# Patient Record
Sex: FEMALE | Race: AMERICAN INDIAN OR ALASKA NATIVE | NOT HISPANIC OR LATINO | Employment: FULL TIME | URBAN - METROPOLITAN AREA
[De-identification: names, ages, dates, MRNs, and addresses within clinical notes are randomized per-mention and may not be internally consistent; named-entity substitution may affect disease eponyms.]

---

## 2023-11-09 PROBLEM — M54.50 LUMBAR PAIN: Status: ACTIVE | Noted: 2023-11-09

## 2023-11-09 PROBLEM — M43.10 SPONDYLOLISTHESIS: Status: ACTIVE | Noted: 2023-11-09

## 2023-11-09 PROBLEM — M54.9 BACK PAIN: Status: ACTIVE | Noted: 2023-11-09

## 2023-11-09 PROBLEM — M54.16 LUMBAR RADICULOPATHY: Status: ACTIVE | Noted: 2023-11-09

## 2023-11-09 PROBLEM — Z04.9 CONDITION NOT FOUND: Status: ACTIVE | Noted: 2023-11-09

## 2023-11-09 PROBLEM — M06.9 RHEUMATOID ARTHRITIS (MULTI): Status: ACTIVE | Noted: 2023-11-09

## 2023-11-09 PROBLEM — R31.9 BLOOD URINE: Status: ACTIVE | Noted: 2023-11-09

## 2023-11-09 PROBLEM — D64.9 ANEMIA: Status: ACTIVE | Noted: 2023-11-09

## 2023-11-09 PROBLEM — Q66.70 HIGH ARCHES, CONGENITAL: Status: ACTIVE | Noted: 2023-11-09

## 2023-11-09 RX ORDER — OXYCODONE AND ACETAMINOPHEN 5; 325 MG/1; MG/1
1 TABLET ORAL EVERY 8 HOURS PRN
COMMUNITY
Start: 2022-12-31 | End: 2023-11-10 | Stop reason: ALTCHOICE

## 2023-11-09 RX ORDER — IBUPROFEN 800 MG/1
TABLET ORAL
COMMUNITY
End: 2023-11-10 | Stop reason: ALTCHOICE

## 2023-11-10 ENCOUNTER — LAB (OUTPATIENT)
Dept: LAB | Facility: LAB | Age: 38
End: 2023-11-10
Payer: MEDICARE

## 2023-11-10 ENCOUNTER — OFFICE VISIT (OUTPATIENT)
Dept: PRIMARY CARE | Facility: CLINIC | Age: 38
End: 2023-11-10
Payer: MEDICARE

## 2023-11-10 VITALS
TEMPERATURE: 97.7 F | SYSTOLIC BLOOD PRESSURE: 116 MMHG | RESPIRATION RATE: 16 BRPM | WEIGHT: 190.2 LBS | HEART RATE: 72 BPM | DIASTOLIC BLOOD PRESSURE: 72 MMHG

## 2023-11-10 DIAGNOSIS — Z00.00 GENERAL MEDICAL EXAM: ICD-10-CM

## 2023-11-10 DIAGNOSIS — D64.9 ANEMIA, UNSPECIFIED TYPE: ICD-10-CM

## 2023-11-10 DIAGNOSIS — M54.9 CHRONIC BACK PAIN, UNSPECIFIED BACK LOCATION, UNSPECIFIED BACK PAIN LATERALITY: ICD-10-CM

## 2023-11-10 DIAGNOSIS — Z00.00 GENERAL MEDICAL EXAM: Primary | ICD-10-CM

## 2023-11-10 DIAGNOSIS — G89.29 CHRONIC BACK PAIN, UNSPECIFIED BACK LOCATION, UNSPECIFIED BACK PAIN LATERALITY: ICD-10-CM

## 2023-11-10 DIAGNOSIS — M06.9 RHEUMATOID ARTHRITIS, INVOLVING UNSPECIFIED SITE, UNSPECIFIED WHETHER RHEUMATOID FACTOR PRESENT (MULTI): ICD-10-CM

## 2023-11-10 PROBLEM — Z04.9 CONDITION NOT FOUND: Status: RESOLVED | Noted: 2023-11-09 | Resolved: 2023-11-10

## 2023-11-10 PROBLEM — R31.9 BLOOD URINE: Status: RESOLVED | Noted: 2023-11-09 | Resolved: 2023-11-10

## 2023-11-10 LAB
ALBUMIN SERPL BCP-MCNC: 4.1 G/DL (ref 3.4–5)
ALP SERPL-CCNC: 54 U/L (ref 33–110)
ALT SERPL W P-5'-P-CCNC: 11 U/L (ref 7–45)
ANION GAP SERPL CALC-SCNC: 11 MMOL/L (ref 10–20)
AST SERPL W P-5'-P-CCNC: 15 U/L (ref 9–39)
BILIRUB SERPL-MCNC: 0.4 MG/DL (ref 0–1.2)
BUN SERPL-MCNC: 12 MG/DL (ref 6–23)
CALCIUM SERPL-MCNC: 8.9 MG/DL (ref 8.6–10.3)
CHLORIDE SERPL-SCNC: 104 MMOL/L (ref 98–107)
CHOLEST SERPL-MCNC: 139 MG/DL (ref 0–199)
CHOLESTEROL/HDL RATIO: 2.8
CO2 SERPL-SCNC: 28 MMOL/L (ref 21–32)
CREAT SERPL-MCNC: 0.61 MG/DL (ref 0.5–1.05)
CRP SERPL-MCNC: 0.55 MG/DL
ERYTHROCYTE [DISTWIDTH] IN BLOOD BY AUTOMATED COUNT: 12.6 % (ref 11.5–14.5)
ERYTHROCYTE [SEDIMENTATION RATE] IN BLOOD BY WESTERGREN METHOD: 9 MM/H (ref 0–20)
GFR SERPL CREATININE-BSD FRML MDRD: >90 ML/MIN/1.73M*2
GLUCOSE SERPL-MCNC: 77 MG/DL (ref 74–99)
HCT VFR BLD AUTO: 38.3 % (ref 36–46)
HDLC SERPL-MCNC: 49.9 MG/DL
HGB BLD-MCNC: 12.8 G/DL (ref 12–16)
IRON SATN MFR SERPL: 17 % (ref 25–45)
IRON SERPL-MCNC: 55 UG/DL (ref 35–150)
LDLC SERPL CALC-MCNC: 77 MG/DL
MCH RBC QN AUTO: 33.2 PG (ref 26–34)
MCHC RBC AUTO-ENTMCNC: 33.4 G/DL (ref 32–36)
MCV RBC AUTO: 100 FL (ref 80–100)
NON HDL CHOLESTEROL: 89 MG/DL (ref 0–149)
NRBC BLD-RTO: 0 /100 WBCS (ref 0–0)
PLATELET # BLD AUTO: 240 X10*3/UL (ref 150–450)
POTASSIUM SERPL-SCNC: 3.8 MMOL/L (ref 3.5–5.3)
PROT SERPL-MCNC: 6.5 G/DL (ref 6.4–8.2)
RBC # BLD AUTO: 3.85 X10*6/UL (ref 4–5.2)
SODIUM SERPL-SCNC: 139 MMOL/L (ref 136–145)
TIBC SERPL-MCNC: 330 UG/DL (ref 240–445)
TRIGL SERPL-MCNC: 60 MG/DL (ref 0–149)
TSH SERPL-ACNC: 0.92 MIU/L (ref 0.44–3.98)
UIBC SERPL-MCNC: 275 UG/DL (ref 110–370)
VIT B12 SERPL-MCNC: 482 PG/ML (ref 211–911)
VLDL: 12 MG/DL (ref 0–40)
WBC # BLD AUTO: 6.4 X10*3/UL (ref 4.4–11.3)

## 2023-11-10 PROCEDURE — 80053 COMPREHEN METABOLIC PANEL: CPT

## 2023-11-10 PROCEDURE — 80061 LIPID PANEL: CPT

## 2023-11-10 PROCEDURE — 84443 ASSAY THYROID STIM HORMONE: CPT

## 2023-11-10 PROCEDURE — 83036 HEMOGLOBIN GLYCOSYLATED A1C: CPT

## 2023-11-10 PROCEDURE — 4004F PT TOBACCO SCREEN RCVD TLK: CPT | Performed by: NURSE PRACTITIONER

## 2023-11-10 PROCEDURE — 86140 C-REACTIVE PROTEIN: CPT

## 2023-11-10 PROCEDURE — 86431 RHEUMATOID FACTOR QUANT: CPT

## 2023-11-10 PROCEDURE — 99385 PREV VISIT NEW AGE 18-39: CPT | Performed by: NURSE PRACTITIONER

## 2023-11-10 PROCEDURE — 83550 IRON BINDING TEST: CPT

## 2023-11-10 PROCEDURE — 85652 RBC SED RATE AUTOMATED: CPT

## 2023-11-10 PROCEDURE — 83540 ASSAY OF IRON: CPT

## 2023-11-10 PROCEDURE — 36415 COLL VENOUS BLD VENIPUNCTURE: CPT

## 2023-11-10 PROCEDURE — 85027 COMPLETE CBC AUTOMATED: CPT

## 2023-11-10 PROCEDURE — 86038 ANTINUCLEAR ANTIBODIES: CPT

## 2023-11-10 PROCEDURE — 82607 VITAMIN B-12: CPT

## 2023-11-10 ASSESSMENT — PATIENT HEALTH QUESTIONNAIRE - PHQ9
1. LITTLE INTEREST OR PLEASURE IN DOING THINGS: NOT AT ALL
2. FEELING DOWN, DEPRESSED OR HOPELESS: NOT AT ALL
SUM OF ALL RESPONSES TO PHQ9 QUESTIONS 1 AND 2: 0

## 2023-11-10 ASSESSMENT — ENCOUNTER SYMPTOMS
GASTROINTESTINAL NEGATIVE: 1
ENDOCRINE NEGATIVE: 1
HEMATOLOGIC/LYMPHATIC NEGATIVE: 1
CARDIOVASCULAR NEGATIVE: 1
RESPIRATORY NEGATIVE: 1
EYES NEGATIVE: 1
MUSCULOSKELETAL NEGATIVE: 1
NEUROLOGICAL NEGATIVE: 1
ALLERGIC/IMMUNOLOGIC NEGATIVE: 1
PSYCHIATRIC NEGATIVE: 1
CONSTITUTIONAL NEGATIVE: 1
PALPITATIONS: 0

## 2023-11-10 ASSESSMENT — ANXIETY QUESTIONNAIRES
5. BEING SO RESTLESS THAT IT IS HARD TO SIT STILL: NOT AT ALL
GAD7 TOTAL SCORE: 0
4. TROUBLE RELAXING: NOT AT ALL
2. NOT BEING ABLE TO STOP OR CONTROL WORRYING: NOT AT ALL
3. WORRYING TOO MUCH ABOUT DIFFERENT THINGS: NOT AT ALL
7. FEELING AFRAID AS IF SOMETHING AWFUL MIGHT HAPPEN: NOT AT ALL
1. FEELING NERVOUS, ANXIOUS, OR ON EDGE: NOT AT ALL
6. BECOMING EASILY ANNOYED OR IRRITABLE: NOT AT ALL

## 2023-11-10 ASSESSMENT — PAIN SCALES - GENERAL: PAINLEVEL: 0-NO PAIN

## 2023-11-10 NOTE — PROGRESS NOTES
Subjective   Patient ID: Olga Mina is a 38 y.o. female who presents for Ear Fullness and Nasal Congestion (Pt here to discuss ongoing congestion, left ear fullness).    Patient suffers from joint pain swelling and stiffness since 2014 her mom suffered from rheumatoid arthritis most of her life.   She is currently seeing Kristina Arana functional medicine NP to reset her immune system.   Her mom passed away at 57 last year due to pancreatic cancer and very little familial support.   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   Believes that grandmother was tested for BRCA gene but doesn't know if she was positive.   She gets PAP smear every 3 years with GYN.    Started therapy back in August and it has been helpful.,   Goes to derm annually.     Mumtaz farley reports Tdap up to date.       Review of Systems   Constitutional: Negative.    HENT: Negative.     Eyes: Negative.    Respiratory: Negative.     Cardiovascular: Negative.  Negative for chest pain, palpitations and leg swelling.   Gastrointestinal: Negative.    Endocrine: Negative.    Genitourinary: Negative.    Musculoskeletal: Negative.    Skin: Negative.    Allergic/Immunologic: Negative.    Neurological: Negative.    Hematological: Negative.    Psychiatric/Behavioral: Negative.         Objective   /72   Pulse 72   Temp 36.5 °C (97.7 °F)   Resp 16   Wt 86.3 kg (190 lb 3.2 oz)   LMP 11/01/2023     Physical Exam  Vitals and nursing note reviewed.   Constitutional:       Appearance: Normal appearance.   HENT:      Head: Normocephalic and atraumatic.      Nose: Nose normal.      Mouth/Throat:      Mouth: Mucous membranes are moist.   Eyes:      Pupils: Pupils are equal, round, and reactive to light.   Cardiovascular:      Rate and Rhythm: Normal rate and regular rhythm.      Pulses: Normal pulses.      Heart sounds: Normal heart sounds.   Pulmonary:      Effort: Pulmonary effort is  normal.      Breath sounds: Normal breath sounds.   Abdominal:      Palpations: Abdomen is soft.   Musculoskeletal:         General: Normal range of motion.      Cervical back: Normal range of motion.   Skin:     Capillary Refill: Capillary refill takes 2 to 3 seconds.   Neurological:      General: No focal deficit present.      Mental Status: She is alert and oriented to person, place, and time.      Cranial Nerves: Cranial nerves 2-12 are intact.      Sensory: Sensation is intact.      Motor: Motor function is intact.      Deep Tendon Reflexes: Reflexes are normal and symmetric.   Psychiatric:         Mood and Affect: Mood normal.       Assessment/Plan   Problem List Items Addressed This Visit             ICD-10-CM       Hematology and Neoplasia    Anemia D64.9    Relevant Orders    CBC    Iron and TIBC       Multi-system (Lupus, Sarcoid)    Rheumatoid arthritis (CMS/HCC) M06.9    Relevant Orders    CBC    Comprehensive Metabolic Panel    Iron and TIBC    PIERRE with Reflex to MICKY    Rheumatoid Factor    Sedimentation Rate    C-Reactive Protein       Musculoskeletal and Injuries    Back pain M54.9     Other Visit Diagnoses         Codes    General medical exam    -  Primary Z00.00    Relevant Orders    CBC    Comprehensive Metabolic Panel    Hemoglobin A1C    Vitamin B12    TSH with reflex to Free T4 if abnormal    Lipid Panel

## 2023-11-11 LAB
EST. AVERAGE GLUCOSE BLD GHB EST-MCNC: 97 MG/DL
HBA1C MFR BLD: 5 %
RHEUMATOID FACT SER NEPH-ACNC: <10 IU/ML (ref 0–15)

## 2023-11-13 LAB — ANA SER QL HEP2 SUBST: NEGATIVE

## 2024-01-09 ASSESSMENT — ENCOUNTER SYMPTOMS
DIARRHEA: 0
SORE THROAT: 0
ABDOMINAL PAIN: 0
RHINORRHEA: 1
HEADACHES: 0
COUGH: 0
VOMITING: 0
NECK PAIN: 1

## 2024-01-10 ENCOUNTER — OFFICE VISIT (OUTPATIENT)
Dept: OTOLARYNGOLOGY | Facility: CLINIC | Age: 39
End: 2024-01-10
Payer: MEDICARE

## 2024-01-10 DIAGNOSIS — Z86.69 HISTORY OF IMPACTED CERUMEN: Primary | ICD-10-CM

## 2024-01-10 PROCEDURE — 99203 OFFICE O/P NEW LOW 30 MIN: CPT | Performed by: OTOLARYNGOLOGY

## 2024-01-10 ASSESSMENT — ENCOUNTER SYMPTOMS
DIARRHEA: 0
NECK PAIN: 1
COUGH: 0
SORE THROAT: 0
VOMITING: 0
RHINORRHEA: 1
HEADACHES: 0
ABDOMINAL PAIN: 0

## 2024-01-10 NOTE — PROGRESS NOTES
Olga Mina is a 38 y.o. year old female patient with WAX / EAR INFECTION     Patient presents to the office today to establish new ENT care.  She has a known history of previous ear infections and cerumen impaction.   She states that she did have recent cleaning approximately 2 months ago and the ears have been fine since that visit.  She denies other ENT related concerns.      Review of Systems   HENT:  Positive for ear discharge, ear pain and rhinorrhea. Negative for hearing loss and sore throat.    Respiratory:  Negative for cough.    Gastrointestinal:  Negative for abdominal pain, diarrhea and vomiting.   Musculoskeletal:  Positive for neck pain.   Skin:  Negative for rash.   Neurological:  Negative for headaches.   All other systems reviewed and are negative.        Physical Exam:   General appearance: No acute distress. Normal facies. Symmetric facial movement. No gross lesions of the face are noted.  The external ear structures appear normal. The ear canals patent and the tympanic membranes are intact without evidence of air-fluid levels, retraction, or congenital defects.  Anterior rhinoscopy notes essentially a midline nasal septum. Examination is noted for normal healthy mucosal membranes without any evidence of lesions, polyps, or exudate. The tongue is normally mobile. There are no lesions on the gingiva, buccal, or oral mucosa. There are no oral cavity masses.  The neck is negative for mass lymphadenopathy. The trachea and parotid are clear. The thyroid bed is grossly unremarkable. The salivary gland structures are grossly unremarkable.    Assessment/Plan   History of cerumen impaction  Patient with known history of cerumen impaction and previous ear infections.  Patient doing well today.  Patient's physical examination today is unremarkable.  We will see the patient back when needed for ENT concerns.    All questions were answered in this regard accordingly.      Answers submitted by the patient  for this visit:  Ear Pain Questionnaire (Submitted on 1/9/2024)  Chief Complaint: Ear pain  Affected ear: both  Chronicity: recurrent  Onset: more than 1 month ago  Progression since onset: unchanged  Frequency: hourly  Fever: no fever  Pain - numeric: 3/10

## 2024-04-16 ENCOUNTER — OFFICE VISIT (OUTPATIENT)
Dept: OTOLARYNGOLOGY | Facility: CLINIC | Age: 39
End: 2024-04-16
Payer: MEDICARE

## 2024-04-16 DIAGNOSIS — T16.9XXA FOREIGN BODY IN EAR, UNSPECIFIED LATERALITY, INITIAL ENCOUNTER: Primary | ICD-10-CM

## 2024-04-16 PROCEDURE — 69200 CLEAR OUTER EAR CANAL: CPT | Performed by: PHYSICIAN ASSISTANT

## 2024-04-16 PROCEDURE — 99212 OFFICE O/P EST SF 10 MIN: CPT | Performed by: PHYSICIAN ASSISTANT

## 2024-04-16 NOTE — PROGRESS NOTES
Olga Mina is a 38 y.o. year old female patient with EAR INFECTION  Patient presents to the office for assessment of ears.  Patient complaining of popping cracking fullness sensation in bilateral ears but right greater than left.  She is without other ENT related concerns at this time.        Physical Exam:   General appearance: No acute distress. Normal facies. Symmetric facial movement. No gross lesions of the face are noted.  The external ear structures appear normal.  Patient with 2 hairs in the right ear canal as well as 1 in the left.  These were removed under the otomicroscope with the use of alligator forceps and Szymanski needle.  The ear canals patent and the tympanic membranes are intact without evidence of air-fluid levels, retraction, or congenital defects.  Anterior rhinoscopy notes essentially a midline nasal septum. Examination is noted for normal healthy mucosal membranes without any evidence of lesions, polyps, or exudate. The tongue is normally mobile. There are no lesions on the gingiva, buccal, or oral mucosa. There are no oral cavity masses.  The neck is negative for mass lymphadenopathy. The trachea and parotid are clear. The thyroid bed is grossly unremarkable. The salivary gland structures are grossly unremarkable.    Assessment/Plan   1.  Foreign body ears    Patient seen in the office today for assessment of ears with foreign body in bilateral ears with small hair.  This was removed with alligator forceps and Szymanski needle bilaterally.  Recommendation at this time is observation and see us back as needed.

## 2024-09-17 ENCOUNTER — APPOINTMENT (OUTPATIENT)
Dept: OTOLARYNGOLOGY | Facility: CLINIC | Age: 39
End: 2024-09-17
Payer: MEDICARE

## 2024-09-17 DIAGNOSIS — H93.8X9 SENSATION OF FULLNESS IN EAR, UNSPECIFIED LATERALITY: Primary | ICD-10-CM

## 2024-09-17 PROCEDURE — 99213 OFFICE O/P EST LOW 20 MIN: CPT | Performed by: OTOLARYNGOLOGY

## 2024-09-17 NOTE — PROGRESS NOTES
Olga Mina is a 39 y.o. year old female patient with EAR INFECTION     Patient presents to the office today for assessment of her ears.  Patient states that last week she was experiencing pressure fullness in bilateral ears muffled hearing left with tinnitus left.  She was seen in an urgent care and was told that she did not have infections but had bilateral effusions.  Patient was recommended to utilize nasal steroids and antihistamine.  She continues to use antihistamine but states that she could not tolerate the nasal steroid spray.  Symptoms are improving but not resolved.  All other ENT issues are negative.        Physical Exam:   General appearance: No acute distress. Normal facies. Symmetric facial movement. No gross lesions of the face are noted.  The external ear structures appear normal. The ear canals patent and the tympanic membranes are intact without evidence of air-fluid levels, retraction, or congenital defects.  Anterior rhinoscopy notes essentially a midline nasal septum. Examination is noted for normal healthy mucosal membranes without any evidence of lesions, polyps, or exudate. The tongue is normally mobile. There are no lesions on the gingiva, buccal, or oral mucosa. There are no oral cavity masses.  The neck is negative for mass lymphadenopathy. The trachea and parotid are clear. The thyroid bed is grossly unremarkable. The salivary gland structures are grossly unremarkable.    Assessment/Plan   Ear fullness    Patient seen in the office today for assessment of ear fullness.  Symptoms are improving with utilization of antihistamine.  Patient has determined after taking nasal steroid that she does not tolerate them and has discontinued.  At this time physical examination shows nothing worrisome.  I recommend reduction in salt chocolate caffeine and stress to the best of her ability and follow-up as needed.

## 2024-10-25 ENCOUNTER — OFFICE VISIT (OUTPATIENT)
Dept: PRIMARY CARE | Facility: CLINIC | Age: 39
End: 2024-10-25
Payer: MEDICARE

## 2024-10-25 VITALS
RESPIRATION RATE: 18 BRPM | DIASTOLIC BLOOD PRESSURE: 56 MMHG | HEIGHT: 65 IN | HEART RATE: 64 BPM | WEIGHT: 204 LBS | BODY MASS INDEX: 33.99 KG/M2 | TEMPERATURE: 98.2 F | SYSTOLIC BLOOD PRESSURE: 90 MMHG

## 2024-10-25 DIAGNOSIS — B96.89 BACTERIAL SINUSITIS: Primary | ICD-10-CM

## 2024-10-25 DIAGNOSIS — J32.9 BACTERIAL SINUSITIS: Primary | ICD-10-CM

## 2024-10-25 DIAGNOSIS — Z71.6 ENCOUNTER FOR TOBACCO USE CESSATION COUNSELING: ICD-10-CM

## 2024-10-25 PROCEDURE — 99213 OFFICE O/P EST LOW 20 MIN: CPT | Performed by: NURSE PRACTITIONER

## 2024-10-25 PROCEDURE — 3008F BODY MASS INDEX DOCD: CPT | Performed by: NURSE PRACTITIONER

## 2024-10-25 RX ORDER — MICONAZOLE NITRATE 2 %
2 CREAM (GRAM) TOPICAL AS NEEDED
Qty: 50 EACH | Refills: 1 | Status: SHIPPED | OUTPATIENT
Start: 2024-10-25 | End: 2025-01-23

## 2024-10-25 RX ORDER — BUPROPION HYDROCHLORIDE 150 MG/1
TABLET, EXTENDED RELEASE ORAL
Qty: 183 TABLET | Refills: 0 | Status: SHIPPED | OUTPATIENT
Start: 2024-10-25 | End: 2025-01-26

## 2024-10-25 RX ORDER — IBUPROFEN 200 MG
1 TABLET ORAL EVERY 24 HOURS
Qty: 14 PATCH | Refills: 0 | Status: SHIPPED | OUTPATIENT
Start: 2024-10-25 | End: 2025-01-23

## 2024-10-25 RX ORDER — DOXYCYCLINE 100 MG/1
100 CAPSULE ORAL 2 TIMES DAILY
Qty: 14 CAPSULE | Refills: 0 | Status: SHIPPED | OUTPATIENT
Start: 2024-10-25 | End: 2024-11-01

## 2024-10-25 SDOH — HEALTH STABILITY: MENTAL HEALTH: PERCEIVED MEDIA MESSAGE ABOUT SMOKING: 0

## 2024-10-25 SDOH — HEALTH STABILITY: MENTAL HEALTH: MEAL TIME: 0

## 2024-10-25 SDOH — HEALTH STABILITY: MENTAL HEALTH: CONTACT WITH SUBSTANCE: 0

## 2024-10-25 SDOH — HEALTH STABILITY: MENTAL HEALTH: DECREASE IN PERCEIVED RISK: 0

## 2024-10-25 SDOH — HEALTH STABILITY: MENTAL HEALTH: DRINKING COFFEE: 0

## 2024-10-25 SDOH — HEALTH STABILITY: MENTAL HEALTH: DRINKING ALCOHOL: 0

## 2024-10-25 SDOH — HEALTH STABILITY: MENTAL HEALTH: DISRUPTIVE BEHAVIOR: 0

## 2024-10-25 SDOH — HEALTH STABILITY: MENTAL HEALTH: COMPANY OF SMOKERS: 0

## 2024-10-25 SDOH — HEALTH STABILITY: MENTAL HEALTH: DRIVING: 0

## 2024-10-25 ASSESSMENT — ENCOUNTER SYMPTOMS
INSOMNIA: 0
HEMATOLOGIC/LYMPHATIC NEGATIVE: 1
NEUROLOGICAL NEGATIVE: 1
CARDIOVASCULAR NEGATIVE: 1
RESPIRATORY NEGATIVE: 1
GASTROINTESTINAL NEGATIVE: 1
EYES NEGATIVE: 1
ENDOCRINE NEGATIVE: 1
CONSTITUTIONAL NEGATIVE: 1
IRRITABILITY: 0
SORE THROAT: 0
FATIGUE: 0
ALLERGIC/IMMUNOLOGIC NEGATIVE: 1
STRESS: 0
MUSCULOSKELETAL NEGATIVE: 1
DECREASED CONCENTRATION: 1
PALPITATIONS: 0

## 2024-10-25 ASSESSMENT — LIFESTYLE VARIABLES: CRAVINGS: 1

## 2024-10-25 NOTE — PROGRESS NOTES
Subjective   Patient ID: Olga Mina is a 39 y.o. female who presents for Sinusitis (Pt stated that sinus pain/congestion started a month ago. Pt went to [facility] clinic and was given flonase and zyrtec and she got dizzy/fell. Every morning lots of clear thick drainage.).    Subjective  Olga Mina is a 39 y.o. female who presents for evaluation of sinus pain. Symptoms include: congestion, cough, and facial pain. Onset of symptoms was 2 weeks ago. Symptoms have been unchanged since that time. Past history is significant for no history of pneumonia or bronchitis. Patient is a smoker. She is allergic to PCN her birth father is allergic to it. She was allergic to amox and augmentin.   Assessment/Plan  Acute bacterial sinusitis.  Nasal saline sprays.  Antihistamines per medication orders.  Doxycycline  per medication orders.    Nicotine Dependence  Presents for initial visit. Symptoms include cravings and decreased concentration. Symptoms are negative for fatigue, headache, insomnia, irritability and sore throat. Preferred tobacco types include cigarettes. Preferred cigarette types include filtered. Preferred strength is light. Preferred brands include [place]. Risk factors do not include company of smokers, contact with substance, decrease in perceived risk, disruptive behavior, drinking alcohol, drinking coffee, driving, meal time, perceived media message about smoking or stress.Her first smoke is in the afternoon. She smokes < 1/2 a pack of cigarettes per day. She started smoking when she was 13-14 years old. Past treatments include nothing. The treatment provided no relief. Compliance with prior treatments has been good. Olga is thinking about quitting. Olga has tried to quit 1 time. There is no history of alcohol abuse and drug use.      Review of Systems   Constitutional: Negative.  Negative for fatigue and irritability.   HENT: Negative.  Negative for sore throat.    Eyes: Negative.   "  Respiratory: Negative.     Cardiovascular: Negative.  Negative for chest pain, palpitations and leg swelling.   Gastrointestinal: Negative.    Endocrine: Negative.    Genitourinary: Negative.    Musculoskeletal: Negative.    Skin: Negative.    Allergic/Immunologic: Negative.    Neurological: Negative.    Hematological: Negative.    Psychiatric/Behavioral:  Positive for decreased concentration. The patient does not have insomnia.        Objective   BP 90/56   Pulse 64   Temp 36.8 °C (98.2 °F)   Resp 18   Ht 1.651 m (5' 5\")   Wt 92.5 kg (204 lb)   LMP 10/08/2024   BMI 33.95 kg/m²     Physical Exam  Vitals reviewed.   HENT:      Head: Normocephalic.      Right Ear: Tympanic membrane, ear canal and external ear normal.      Left Ear: Tympanic membrane, ear canal and external ear normal.      Nose: Mucosal edema and congestion present.      Right Turbinates: Not enlarged, swollen or pale.      Left Turbinates: Not enlarged, swollen or pale.      Right Sinus: Maxillary sinus tenderness and frontal sinus tenderness present.      Left Sinus: Maxillary sinus tenderness and frontal sinus tenderness present.      Mouth/Throat:      Lips: Pink.      Mouth: Mucous membranes are moist.      Pharynx: Oropharynx is clear.   Cardiovascular:      Rate and Rhythm: Normal rate and regular rhythm.      Pulses: Normal pulses.      Heart sounds: Normal heart sounds. No murmur heard.     No friction rub. No gallop.   Pulmonary:      Effort: Pulmonary effort is normal. No respiratory distress.      Breath sounds: Normal breath sounds. No stridor. No wheezing, rhonchi or rales.   Chest:      Chest wall: No tenderness.   Neurological:      General: No focal deficit present.      Mental Status: She is alert and oriented to person, place, and time. Mental status is at baseline.         Assessment/Plan   Problem List Items Addressed This Visit    None  Visit Diagnoses         Codes    Bacterial sinusitis    -  Primary J32.9, B96.89    " Relevant Medications    doxycycline (Vibramycin) 100 mg capsule    Encounter for tobacco use cessation counseling     Z71.6    Relevant Medications    buPROPion SR (Wellbutrin SR) 150 mg 12 hr tablet    nicotine (Nicoderm CQ) 14 mg/24 hr patch    nicotine polacrilex (Nicorette) 2 mg gum

## 2024-10-25 NOTE — PATIENT INSTRUCTIONS
May be used as monotherapy or in combination with nicotine replacement therapy (Ref). Therapy should begin at least 1 week before target quit date. Target quit dates are generally in the second week of treatment. If patient successfully quits smoking, continue treatment for at least 12 weeks. May consider maintenance therapy based on individual patient risk:benefit; evidence suggests relapse prevention benefits with continuing therapy for up to 1 year. Conversely, if significant progress has not been made by the seventh week of therapy, success is unlikely; consider combination therapy or discontinuation and use of an alternative agent (Ref).  12-hour extended release (sustained release): Oral: Initial: 150 mg once daily for 3 days; increase to 150 mg twice daily (maximum dose: 300 mg/day). For patients who do not tolerate titration to the full dose, consider continuing 150 mg once daily; the lower dose has shown efficacy (Ref).

## 2025-01-13 ENCOUNTER — APPOINTMENT (OUTPATIENT)
Dept: RADIOLOGY | Facility: CLINIC | Age: 40
End: 2025-01-13
Payer: MEDICARE

## 2025-01-13 ENCOUNTER — OFFICE VISIT (OUTPATIENT)
Facility: CLINIC | Age: 40
End: 2025-01-13
Payer: MEDICARE

## 2025-01-13 ENCOUNTER — HOSPITAL ENCOUNTER (OUTPATIENT)
Dept: RADIOLOGY | Facility: CLINIC | Age: 40
Discharge: HOME | End: 2025-01-13
Payer: MEDICARE

## 2025-01-13 VITALS
WEIGHT: 202.7 LBS | HEART RATE: 72 BPM | HEIGHT: 65 IN | RESPIRATION RATE: 16 BRPM | BODY MASS INDEX: 33.77 KG/M2 | TEMPERATURE: 98.1 F | DIASTOLIC BLOOD PRESSURE: 72 MMHG | SYSTOLIC BLOOD PRESSURE: 112 MMHG

## 2025-01-13 DIAGNOSIS — M54.41 ACUTE RIGHT-SIDED LOW BACK PAIN WITH RIGHT-SIDED SCIATICA: ICD-10-CM

## 2025-01-13 DIAGNOSIS — M54.16 LUMBAR RADICULOPATHY: ICD-10-CM

## 2025-01-13 DIAGNOSIS — R20.2 RIGHT LEG PARESTHESIAS: ICD-10-CM

## 2025-01-13 DIAGNOSIS — Z98.1 S/P LUMBAR FUSION: ICD-10-CM

## 2025-01-13 DIAGNOSIS — M54.16 LUMBAR RADICULOPATHY: Primary | ICD-10-CM

## 2025-01-13 PROBLEM — M25.511 RIGHT SHOULDER PAIN: Status: ACTIVE | Noted: 2019-01-28

## 2025-01-13 PROBLEM — M54.2 MYOFASCIAL NECK PAIN: Status: ACTIVE | Noted: 2020-12-01

## 2025-01-13 PROBLEM — G54.0 BRACHIAL PLEXUS NEUROPATHY: Status: ACTIVE | Noted: 2020-01-01

## 2025-01-13 PROCEDURE — 99214 OFFICE O/P EST MOD 30 MIN: CPT | Performed by: NURSE PRACTITIONER

## 2025-01-13 PROCEDURE — 3008F BODY MASS INDEX DOCD: CPT | Performed by: NURSE PRACTITIONER

## 2025-01-13 PROCEDURE — 72148 MRI LUMBAR SPINE W/O DYE: CPT | Performed by: RADIOLOGY

## 2025-01-13 PROCEDURE — 72120 X-RAY BEND ONLY L-S SPINE: CPT

## 2025-01-13 PROCEDURE — 72110 X-RAY EXAM L-2 SPINE 4/>VWS: CPT | Performed by: RADIOLOGY

## 2025-01-13 PROCEDURE — 72148 MRI LUMBAR SPINE W/O DYE: CPT

## 2025-01-13 RX ORDER — IMIQUIMOD 12.5 MG/.25G
CREAM TOPICAL
COMMUNITY
Start: 2024-10-07

## 2025-01-13 RX ORDER — IBUPROFEN 800 MG/1
800 TABLET ORAL EVERY 8 HOURS PRN
COMMUNITY
Start: 2024-01-16

## 2025-01-13 RX ORDER — NYSTATIN 100000 [USP'U]/G
POWDER TOPICAL 3 TIMES DAILY
COMMUNITY
Start: 2024-10-07

## 2025-01-13 RX ORDER — CYCLOBENZAPRINE HCL 10 MG
TABLET ORAL
COMMUNITY
Start: 2025-01-03

## 2025-01-13 ASSESSMENT — PATIENT HEALTH QUESTIONNAIRE - PHQ9
SUM OF ALL RESPONSES TO PHQ9 QUESTIONS 1 AND 2: 1
1. LITTLE INTEREST OR PLEASURE IN DOING THINGS: SEVERAL DAYS
2. FEELING DOWN, DEPRESSED OR HOPELESS: NOT AT ALL

## 2025-01-13 ASSESSMENT — PAIN SCALES - GENERAL: PAINLEVEL_OUTOF10: 8

## 2025-01-13 NOTE — PROGRESS NOTES
[facility]  Department of Neurological Surgery  Established Patient Visit    History of Present Illness  Olga Mina is a 39 y.o. female who presents to the spine clinic in follow up with low back and right leg pain since a fall on New Year's. She has a history of an L5-S1 fusion with Dr. Perez at [facility] in 2014 after she was found to have a grade III spondylolisthesis at L5-S1 with central disc herniation. She reports she has been doing well since her surgery. She is active daily and does home stretching and therapy daily.   Unfortunately on New Year's Day she got her foot caught in a coffee table and fell forward hitting the right side of her face, arm, and legs. She was evaluated at a [facility] and X-rays of the lumbar spine deonstrated stable lumbar fusion with interbody spacer as L5-S1 with stable anterolisthesis L5 on S1. She has been using ibuprofen, Flexeril, vitamins, and ice at home with some relief. Ever since the fall she has had neck, low back and RLE leg pain. She endorses numbness and paresthesias of the RLE that are new since the fall. Pain is described as sharp and stabbing in the right lower back, radiating into the right buttocks and down the anterior, lateral and posterior thigh. She denies left sided symptoms.  Prolonged sitting and standing make the pain worse. Rest makes he pain better. She has had difficulty sleeping due to pain. Denies saddle anesthesia.         TREATMENTS:  PT; multiple rounds of PT over the years  Home Exercise Program; currently doing home exercises and stretches daily for greater than 6 weeks  Patient's BMI is Body mass index is 33.73 kg/m².  SMOKER: current everyday smoker, has cut down to 1/2 ppd over the past few months    ROS x 10 is, otherwise, negative unless documented in HPI above    Patient Active Problem List   Diagnosis    Rheumatoid arthritis    Lumbar radiculopathy    Lumbar pain    Back pain    High  arches, congenital    Anemia    Spondylolisthesis    History of impacted cerumen    Sensation of fullness in ear    Brachial plexus neuropathy    Carpal tunnel syndrome    Myofascial neck pain    Right shoulder pain    Sprain of wrist     Past Medical History:   Diagnosis Date    Blood urine 11/09/2023    Personal history of (healed) traumatic fracture 09/09/2014    History of fracture of arm    Personal history of (healed) traumatic fracture 09/09/2014    History of fracture of toe     Past Surgical History:   Procedure Laterality Date    TONSILLECTOMY  09/09/2014    Tonsillectomy     Social History     Tobacco Use    Smoking status: Every Day     Current packs/day: 0.50     Types: Cigarettes     Passive exposure: Never    Smokeless tobacco: Never    Tobacco comments:     Cutting down is now doing a half a pack a day going to taper down    Substance Use Topics    Alcohol use: Not Currently     Comment: none     family history includes No Known Problems in her father; Rheum arthritis in her mother; skin cancer in her maternal grandmother.    Current Outpatient Medications:     cyclobenzaprine (Flexeril) 10 mg tablet, TAKE 1 TABLET BY MOUTH THREE TIMES A DAY AS NEEDED FOR MUSCLE SPASM OR PAIN., Disp: , Rfl:     buPROPion SR (Wellbutrin SR) 150 mg 12 hr tablet, Take 1 tablet (150 mg) by mouth once daily for 3 days, THEN 1 tablet (150 mg) 2 times a day. Do not crush, chew, or split.. (Patient not taking: Reported on 1/13/2025), Disp: 183 tablet, Rfl: 0    ibuprofen 800 mg tablet, Take 1 tablet (800 mg) by mouth every 8 hours if needed. (Patient not taking: Reported on 1/13/2025), Disp: , Rfl:     imiquimod (Aldara) 5 % cream, Apply a thin layer to affected skin at bedtime every other day. Wash off with soap and water after 8 hr. This may be repeated for up to 16 weeks. (Patient not taking: Reported on 1/13/2025), Disp: , Rfl:     nicotine (Nicoderm CQ) 14 mg/24 hr patch, Place 1 patch over 24 hours on the skin once  every 24 hours. (Patient not taking: Reported on 1/13/2025), Disp: 14 patch, Rfl: 0    nicotine polacrilex (Nicorette) 2 mg gum, Chew 1 each (2 mg) if needed for smoking cessation. (Patient not taking: Reported on 1/13/2025), Disp: 50 each, Rfl: 1    nystatin (Mycostatin) 100,000 unit/gram powder, Apply topically 3 times a day. (Patient not taking: Reported on 1/13/2025), Disp: , Rfl:   Allergies   Allergen Reactions    Cephalosporins Unknown    Diphenhydramine Other     Feel really dizzy, nauseous, flighty, jittery     Feel really dizzy, nauseous, flighty, jittery    Flonase [Fluticasone Propionate] Dizziness and Nausea/vomiting    Hydrocodone GI Upset     Other reaction(s): GI Upset    Methocarbamol Unknown    Naproxen Hives    Penicillins Unknown    Prednisone Dizziness, GI Upset, Itching, Nausea Only, Other and Diarrhea     Other reaction(s): Dizziness, GI Upset, Intolerance, Vomiting, vomiting, dizziness    Suprax [Cefixime] Unknown    Zyrtec [Cetirizine] Dizziness and Nausea/vomiting       Physical Examination:    General: Well developed, awake/alert/oriented x3, no distress, alert and cooperative  Skin: Warm and dry, no lesions, no rashes  ENMT: Mucous membranes moist, no apparent injury, no lesions seen  Head/Neck: Neck Supple, no apparent injury  Respiratory/Thorax: Normal breath sounds with good chest expansion, thorax symmetric  Cardiovascular: No pitting edema, no JVD    Motor Strength: 5/5 Throughout all extremities    Muscle Bulk: Normal and symmetric in all extremities    Posture:   -- Cervical: Normal  -- Thoracic: Normal  -- Lumbar : Normal  Paraspinal muscle spasm/tenderness absent.     Sensation: reduced to light touch over the entire right lateral leg, otherwise intact      Assessment and Plan:      Olga Mina is a 39 y.o.  female who presents to the spine clinic in follow up with complaints of low back and RLE radicular leg pain with paresthesias since a fall around New Year's. X-ray imaging  completed at [facility] urgent care demonstrated stable posterior fusion with intervertebral body disc spacer L5-S1 with stable anterolisthesis L5 on S1.  No acute fracture.  She however continues to have low back and RLE pain with paresthesias since the fall.  Will obtain an updated set of dynamic X-rays of the lumbar spine to evaluate for any lumbar instability. Given new paresthesias of the RLE since the fall I am concerned for nerve impingement. Will obtain an MRI of the lumbar spine to evaluate soft tissue and for any compressive pathology or nerve impingement. I will follow up with her upon completion of imaging studies with further recommendations at that time. She is agreeable to this plan.         The above clinical summary has been dictated with voice recognition software. It has not been proofread for grammatical errors, typographical mistakes, or other semantic inconsistencies.     Thank you for visiting our office today. It was our pleasure to take part in your healthcare.      Do not hesitate to call with any questions regarding your plan of care after leaving at [phone] M-F 8am-4pm.      To clinicians, thank you very much for this kind referral. It is a privilege to partner with you in the care of your patients. My office would be delighted to assist you with any further consultations or with questions regarding the plan of care outlined. Do not hesitate to call the office or contact me directly.         Sincerely,          Bronwyn PRATER-[address]  [place] [address]    [address] [place] [address]  [address]  [place] [address]     Phone: [phone]  Fax: [phone]

## 2025-01-14 DIAGNOSIS — T84.498A LOOSENING OF HARDWARE IN SPINE (CMS-HCC): ICD-10-CM

## 2025-01-14 DIAGNOSIS — Z98.1 S/P LUMBAR FUSION: Primary | ICD-10-CM

## 2025-01-14 DIAGNOSIS — M54.10 RADICULAR LEG PAIN: ICD-10-CM

## 2025-01-14 NOTE — PROGRESS NOTES
X-ray imaging is concerning for fracture of the right S1 pedicle screw. Will obtain a CT as recommended for further evaluation.       Bronwyn Voss [facility]  [address] [facility] [place] [address]    [address] [place] [address]  [address] [place] [address]     Phone: [phone]  Fax: [phone]

## 2025-01-15 ENCOUNTER — HOSPITAL ENCOUNTER (OUTPATIENT)
Dept: RADIOLOGY | Facility: CLINIC | Age: 40
Discharge: HOME | End: 2025-01-15
Payer: MEDICARE

## 2025-01-15 DIAGNOSIS — Z98.1 S/P LUMBAR FUSION: ICD-10-CM

## 2025-01-15 DIAGNOSIS — M54.10 RADICULAR LEG PAIN: ICD-10-CM

## 2025-01-15 DIAGNOSIS — T84.498A LOOSENING OF HARDWARE IN SPINE (CMS-HCC): ICD-10-CM

## 2025-01-15 PROCEDURE — 72131 CT LUMBAR SPINE W/O DYE: CPT

## 2025-01-15 PROCEDURE — 72131 CT LUMBAR SPINE W/O DYE: CPT | Performed by: RADIOLOGY

## 2025-05-30 ENCOUNTER — APPOINTMENT (OUTPATIENT)
Dept: RADIOLOGY | Facility: HOSPITAL | Age: 40
End: 2025-05-30
Payer: MEDICARE

## 2025-05-30 ENCOUNTER — HOSPITAL ENCOUNTER (EMERGENCY)
Facility: HOSPITAL | Age: 40
Discharge: HOME | End: 2025-05-30
Payer: MEDICARE

## 2025-05-30 VITALS
HEIGHT: 65 IN | BODY MASS INDEX: 28.32 KG/M2 | SYSTOLIC BLOOD PRESSURE: 129 MMHG | RESPIRATION RATE: 18 BRPM | OXYGEN SATURATION: 99 % | HEART RATE: 65 BPM | WEIGHT: 170 LBS | DIASTOLIC BLOOD PRESSURE: 81 MMHG | TEMPERATURE: 97.5 F

## 2025-05-30 DIAGNOSIS — M54.50 ACUTE MIDLINE LOW BACK PAIN WITHOUT SCIATICA: Primary | ICD-10-CM

## 2025-05-30 PROCEDURE — 99284 EMERGENCY DEPT VISIT MOD MDM: CPT | Performed by: PHYSICIAN ASSISTANT

## 2025-05-30 PROCEDURE — 2500000001 HC RX 250 WO HCPCS SELF ADMINISTERED DRUGS (ALT 637 FOR MEDICARE OP): Performed by: PHYSICIAN ASSISTANT

## 2025-05-30 PROCEDURE — 72131 CT LUMBAR SPINE W/O DYE: CPT | Performed by: RADIOLOGY

## 2025-05-30 PROCEDURE — 72131 CT LUMBAR SPINE W/O DYE: CPT

## 2025-05-30 PROCEDURE — 99284 EMERGENCY DEPT VISIT MOD MDM: CPT

## 2025-05-30 RX ORDER — OXYCODONE AND ACETAMINOPHEN 5; 325 MG/1; MG/1
1 TABLET ORAL ONCE
Refills: 0 | Status: COMPLETED | OUTPATIENT
Start: 2025-05-30 | End: 2025-05-30

## 2025-05-30 RX ORDER — OXYCODONE AND ACETAMINOPHEN 5; 325 MG/1; MG/1
1 TABLET ORAL EVERY 6 HOURS PRN
Qty: 10 TABLET | Refills: 0 | Status: SHIPPED | OUTPATIENT
Start: 2025-05-30 | End: 2025-05-30

## 2025-05-30 RX ORDER — OXYCODONE AND ACETAMINOPHEN 5; 325 MG/1; MG/1
1 TABLET ORAL EVERY 6 HOURS PRN
Qty: 10 TABLET | Refills: 0 | Status: SHIPPED | OUTPATIENT
Start: 2025-05-30 | End: 2025-06-02

## 2025-05-30 RX ADMIN — OXYCODONE HYDROCHLORIDE AND ACETAMINOPHEN 1 TABLET: 5; 325 TABLET ORAL at 19:02

## 2025-05-30 ASSESSMENT — COLUMBIA-SUICIDE SEVERITY RATING SCALE - C-SSRS
1. IN THE PAST MONTH, HAVE YOU WISHED YOU WERE DEAD OR WISHED YOU COULD GO TO SLEEP AND NOT WAKE UP?: NO
2. HAVE YOU ACTUALLY HAD ANY THOUGHTS OF KILLING YOURSELF?: NO
6. HAVE YOU EVER DONE ANYTHING, STARTED TO DO ANYTHING, OR PREPARED TO DO ANYTHING TO END YOUR LIFE?: NO

## 2025-05-30 ASSESSMENT — LIFESTYLE VARIABLES
HAVE YOU EVER FELT YOU SHOULD CUT DOWN ON YOUR DRINKING: NO
TOTAL SCORE: 0
EVER HAD A DRINK FIRST THING IN THE MORNING TO STEADY YOUR NERVES TO GET RID OF A HANGOVER: NO
HAVE PEOPLE ANNOYED YOU BY CRITICIZING YOUR DRINKING: NO
EVER FELT BAD OR GUILTY ABOUT YOUR DRINKING: NO

## 2025-05-30 ASSESSMENT — PAIN DESCRIPTION - PAIN TYPE: TYPE: ACUTE PAIN

## 2025-05-30 ASSESSMENT — PAIN DESCRIPTION - LOCATION: LOCATION: BACK

## 2025-05-30 ASSESSMENT — PAIN SCALES - GENERAL
PAINLEVEL_OUTOF10: 9
PAINLEVEL_OUTOF10: 3
PAINLEVEL_OUTOF10: 4

## 2025-05-30 ASSESSMENT — PAIN - FUNCTIONAL ASSESSMENT: PAIN_FUNCTIONAL_ASSESSMENT: 0-10

## 2025-05-30 NOTE — DISCHARGE INSTRUCTIONS
You were seen in the emergency department for evaluation of low back pain.    Your CT showed stable chronic changes after your surgery.  There is a broken screw that should be reevaluated by surgery.    You were prescribed Percocet.  Do not drive while taking this medication as it can make you drowsy.    Please follow-up with your primary care provider.  If you do not have a primary care provider you can call [phone] to make an appointment.    Please do not hesitate to return to the ED if symptoms change or worsen.
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
## 2025-06-09 ENCOUNTER — OFFICE VISIT (OUTPATIENT)
Dept: ORTHOPEDIC SURGERY | Facility: CLINIC | Age: 40
End: 2025-06-09
Payer: MEDICARE

## 2025-06-09 DIAGNOSIS — M54.50 ACUTE MIDLINE LOW BACK PAIN WITHOUT SCIATICA: ICD-10-CM

## 2025-06-09 PROCEDURE — 99243 OFF/OP CNSLTJ NEW/EST LOW 30: CPT | Performed by: ORTHOPAEDIC SURGERY

## 2025-06-09 PROCEDURE — 99212 OFFICE O/P EST SF 10 MIN: CPT | Performed by: ORTHOPAEDIC SURGERY
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
## 2025-06-09 NOTE — LETTER
June 9, 2025     Mary Elliott, APRN-CNP  [address]  [address]  [facility] [address]    Patient: Olga Mina   YOB: 1985   Date of Visit: 6/9/2025       Dear Dr. Mary Elliott, APRN-CNP:    Thank you for referring Olga Mina to me for evaluation. Below are my notes for this consultation.  If you have questions, please do not hesitate to call me. I look forward to following your patient along with you.       Sincerely,     Andres Amador MD      CC: Lenny Olivares PA-C  ______________________________________________________________________________________    40-year-old female chief complaint of acute on chronic lower back pain.  She has a remote history of L5-S1 MIS TLIF in 2014 for bilateral pars fracture.  She has had chronic back pain since the operation.  She has had a broken screw for at least the last 8 years.  She gets flareups of back pain from time to time, has been following with pain management and sports medicine.  She had recent injections earlier this year which did not help as much as they have in the past.  She had an acute flareup about 10 days ago and went to the urgent care and was given Percocet, she says this is the only thing that helps her pain.    On exam she is well-appearing and in no distress.  She does not have any focal neurologic deficits.  Normal gait.    I reviewed CT scan of her lumbar spine which shows screw fracture along the right sided S1.  MRI does not show any stenosis.    She continues to smoke about 1/4 pack a day.    From a surgical standpoint I do not recommend any intervention acutely.  I do think it is critical she begins a physical therapy program and continues with aerobic exercise, at least 30 minutes every other day.  I explained to her that if she continues to use nicotine she is absolutely not a surgical candidate.  She would have to quit for at least a month before surgery and be able to maintain a nicotine free status for at least  12 months following surgery in order to consider moving forward.    I would be happy to see her back on an as needed basis.    *This note was dictated using speech recognition software and was not corrected for spelling or grammatical errors*    Medical History[1]    Current Medications[2]     Social History     Socioeconomic History   • Marital status: Significant Other     Spouse name: Not on file   • Number of children: Not on file   • Years of education: Not on file   • Highest education level: Not on file   Occupational History   • Not on file   Tobacco Use   • Smoking status: Every Day     Current packs/day: 0.50     Types: Cigarettes     Passive exposure: Never   • Smokeless tobacco: Never   • Tobacco comments:     Cutting down is now doing a half a pack a day going to taper down    Vaping Use   • Vaping status: Never Used   Substance and Sexual Activity   • Alcohol use: Not Currently     Comment: none   • Drug use: Yes     Types: Marijuana     Comment: joints   • Sexual activity: Not on file   Other Topics Concern   • Not on file   Social History Narrative   • Not on file     Social Drivers of Health     Financial Resource Strain: High Risk (9/8/2024)    Received from [employer]    Overall Financial Resource Strain (CARDIA)    • Difficulty of Paying Living Expenses: Very hard   Food Insecurity: Food Insecurity Present (9/8/2024)    Received from [employer]    Hunger Vital Sign    • Worried About Running Out of Food in the Last Year: Sometimes true    • Ran Out of Food in the Last Year: Sometimes true   Transportation Needs: No Transportation Needs (9/8/2024)    Received from [employer]    PRAPARE - Transportation    • Lack of Transportation (Medical): No    • Lack of Transportation (Non-Medical): No   Physical Activity: Sufficiently Active (9/8/2024)    Received from [employer]    Exercise Vital Sign    • Days of Exercise per Week: 5 days    • Minutes of Exercise per Session: 60 min   Stress: Stress  Concern Present (9/8/2024)    Received from [employer] [facility]    [place] [employer] - Occupational Stress Questionnaire    • Feeling of Stress : To some extent   Social Connections: Moderately Integrated (9/8/2024)    Received from [employer]    Social Connection and Isolation Panel [NHANES]    • Frequency of Communication with Friends and Family: Twice a week    • Frequency of Social Gatherings with Friends and Family: Once a week    • Attends Jewish Services: More than 4 times per year    • Active Member of Clubs or Organizations: No    • Attends Club or Organization Meetings: 1 to 4 times per year    • Marital Status: Never    Intimate Partner Violence: Not At Risk (9/8/2024)    Received from [employer]    Humiliation, Afraid, Rape, and Kick questionnaire    • Fear of Current or Ex-Partner: No    • Emotionally Abused: No    • Physically Abused: No    • Sexually Abused: No   Housing Stability: High Risk (9/8/2024)    Received from [employer]    Housing Stability Vital Sign    • Unable to Pay for Housing in the Last Year: Yes    • Number of Times Moved in the Last Year: Not on file    • Homeless in the Last Year: No       Andres Amador MD  Director, [facility] [place]   Department of Orthopaedic Surgery  [address] [place] [address]  [phone]       [1]  Past Medical History:  Diagnosis Date   • Blood urine 11/09/2023   • Personal history of (healed) traumatic fracture 09/09/2014    History of fracture of arm   • Personal history of (healed) traumatic fracture 09/09/2014    History of fracture of toe   [2]    Current Outpatient Medications:   •  buPROPion SR (Wellbutrin SR) 150 mg 12 hr tablet, Take 1 tablet (150 mg) by mouth once daily for 3 days, THEN 1 tablet (150 mg) 2 times a day. Do not crush, chew, or split.. (Patient not taking: Reported on 1/13/2025), Disp: 183 tablet, Rfl: 0  •   cyclobenzaprine (Flexeril) 10 mg tablet, TAKE 1 TABLET BY MOUTH THREE TIMES A DAY AS NEEDED FOR MUSCLE SPASM OR PAIN., Disp: , Rfl:   •  ibuprofen 800 mg tablet, Take 1 tablet (800 mg) by mouth every 8 hours if needed. (Patient not taking: Reported on 1/13/2025), Disp: , Rfl:   •  imiquimod (Aldara) 5 % cream, Apply a thin layer to affected skin at bedtime every other day. Wash off with soap and water after 8 hr. This may be repeated for up to 16 weeks. (Patient not taking: Reported on 1/13/2025), Disp: , Rfl:   •  nicotine (Nicoderm CQ) 14 mg/24 hr patch, Place 1 patch over 24 hours on the skin once every 24 hours. (Patient not taking: Reported on 1/13/2025), Disp: 14 patch, Rfl: 0  •  nicotine polacrilex (Nicorette) 2 mg gum, Chew 1 each (2 mg) if needed for smoking cessation. (Patient not taking: Reported on 1/13/2025), Disp: 50 each, Rfl: 1  •  nystatin (Mycostatin) 100,000 unit/gram powder, Apply topically 3 times a day. (Patient not taking: Reported on 1/13/2025), Disp: , Rfl:

## 2025-07-23 ENCOUNTER — APPOINTMENT (OUTPATIENT)
Dept: PRIMARY CARE | Facility: CLINIC | Age: 40
End: 2025-07-23
Payer: MEDICARE

## 2025-07-23 VITALS
DIASTOLIC BLOOD PRESSURE: 72 MMHG | SYSTOLIC BLOOD PRESSURE: 116 MMHG | WEIGHT: 175 LBS | OXYGEN SATURATION: 96 % | HEART RATE: 70 BPM | BODY MASS INDEX: 29.12 KG/M2 | RESPIRATION RATE: 16 BRPM

## 2025-07-23 DIAGNOSIS — Z00.00 ROUTINE GENERAL MEDICAL EXAMINATION AT A HEALTH CARE FACILITY: ICD-10-CM

## 2025-07-23 DIAGNOSIS — M06.9 RHEUMATOID ARTHRITIS, INVOLVING UNSPECIFIED SITE, UNSPECIFIED WHETHER RHEUMATOID FACTOR PRESENT (MULTI): ICD-10-CM

## 2025-07-23 DIAGNOSIS — E01.0 THYROMEGALY: ICD-10-CM

## 2025-07-23 DIAGNOSIS — R16.0 HEPATOMEGALY: Primary | ICD-10-CM

## 2025-07-23 PROCEDURE — 99213 OFFICE O/P EST LOW 20 MIN: CPT | Performed by: NURSE PRACTITIONER

## 2025-07-23 ASSESSMENT — PATIENT HEALTH QUESTIONNAIRE - PHQ9
SUM OF ALL RESPONSES TO PHQ9 QUESTIONS 1 AND 2: 0
2. FEELING DOWN, DEPRESSED OR HOPELESS: NOT AT ALL
1. LITTLE INTEREST OR PLEASURE IN DOING THINGS: NOT AT ALL

## 2025-07-23 ASSESSMENT — ENCOUNTER SYMPTOMS
OCCASIONAL FEELINGS OF UNSTEADINESS: 0
DEPRESSION: 0
BACK PAIN: 1
RESPIRATORY NEGATIVE: 1
LOSS OF SENSATION IN FEET: 0
NECK PAIN: 0
ARTHRALGIAS: 1
MYALGIAS: 1
JOINT SWELLING: 0
CARDIOVASCULAR NEGATIVE: 1
NECK STIFFNESS: 0
GASTROINTESTINAL NEGATIVE: 1

## 2025-07-23 NOTE — PROGRESS NOTES
Roberto Mina is a 40 y.o. female who presents for Results (Fu for CT scan results. Report states the liver looked enlarged and she is concerned because no one has addressed this with her. ).  3 months ago had CT in the ER for intractable back pain.  She was given some pain medication and sent to ortho, who advised against surgery. She sought out PT and Chiropractor on her own and has had very good success. She also has set up with a surgeon who works with her old surgeon who is about to retire. Overall her pain has improved at this time. She has cessated her smoking greatly and nearly quit.         Review of Systems   Respiratory: Negative.     Cardiovascular: Negative.    Gastrointestinal: Negative.    Musculoskeletal:  Positive for arthralgias, back pain and myalgias. Negative for gait problem, joint swelling, neck pain and neck stiffness.     Objective   Physical Exam  Vitals reviewed.   HENT:      Head: Normocephalic.   Neck:      Thyroid: Thyromegaly present.     Cardiovascular:      Rate and Rhythm: Normal rate and regular rhythm.      Pulses: Normal pulses.      Heart sounds: Normal heart sounds. No murmur heard.     No friction rub. No gallop.   Pulmonary:      Effort: Pulmonary effort is normal. No respiratory distress.      Breath sounds: Normal breath sounds. No stridor. No wheezing, rhonchi or rales.   Chest:      Chest wall: No tenderness.     Neurological:      General: No focal deficit present.      Mental Status: She is alert and oriented to person, place, and time. Mental status is at baseline.     Psychiatric:         Mood and Affect: Mood normal.         Behavior: Behavior normal.         Thought Content: Thought content normal.         Judgment: Judgment normal.       /72   Pulse 70   Resp 16   Wt 79.4 kg (175 lb)   LMP  (LMP Unknown)   SpO2 96%   BMI 29.12 kg/m²   Assessment/Plan   Problem List Items Addressed This Visit       Thyromegaly     Other Visit Diagnoses          Hepatomegaly    -  Primary    Relevant Orders    US abdomen limited liver      Routine general medical examination at a health care facility        Relevant Orders    Referral to Gynecology          Discussed that liver enzymes are normal but her CT coincidentally found enlarged liver. We will get US, last liver enzymes WNL.

## 2025-07-24 PROBLEM — E01.0 THYROMEGALY: Status: ACTIVE | Noted: 2025-07-24

## 2025-07-30 ENCOUNTER — APPOINTMENT (OUTPATIENT)
Dept: RADIOLOGY | Facility: CLINIC | Age: 40
End: 2025-07-30
Payer: MEDICARE

## 2025-07-30 DIAGNOSIS — R16.0 HEPATOMEGALY: ICD-10-CM

## 2025-07-30 PROCEDURE — 76705 ECHO EXAM OF ABDOMEN: CPT | Performed by: RADIOLOGY

## 2025-07-30 PROCEDURE — 76705 ECHO EXAM OF ABDOMEN: CPT

## 2025-07-31 ENCOUNTER — APPOINTMENT (OUTPATIENT)
Dept: OTOLARYNGOLOGY | Facility: CLINIC | Age: 40
End: 2025-07-31
Payer: MEDICARE

## 2025-08-01 ENCOUNTER — OFFICE VISIT (OUTPATIENT)
Dept: OTOLARYNGOLOGY | Facility: CLINIC | Age: 40
End: 2025-08-01
Payer: MEDICARE

## 2025-08-01 DIAGNOSIS — H61.23 BILATERAL IMPACTED CERUMEN: Primary | ICD-10-CM

## 2025-08-01 PROCEDURE — 69210 REMOVE IMPACTED EAR WAX UNI: CPT

## 2025-08-01 NOTE — PROGRESS NOTES
Subjective   Patient ID: Olga Mina is a 40 y.o. female who presents for Cerumen Impaction.    HPI  The patient returns, being seen for evaluation of ears. History of cerumen impaction, here today for removal of same. She denies otalgia, otorrhea, tinnitus, decreased hearing, dizziness, vertigo. All remaining head neck inquiry otherwise negative.     Physical Exam & Procedure:  Bilateral impacted cerumen removed under otomicroscopic examination using suction, curette, and forceps. Patient tolerated procedure without difficulty. Following removal, TMs are intact with no sign of infection, effusion, retraction, or perforation.     Assessment/Plan   Patient presents for evaluation of cerumen impaction. The ears were cleaned using otomicroscope and instrumentation.  Patient tolerated the procedure well. All questions were answered to patient's satisfaction. Patient to follow-up as needed.

## 2025-09-12 ENCOUNTER — APPOINTMENT (OUTPATIENT)
Dept: OBSTETRICS AND GYNECOLOGY | Facility: CLINIC | Age: 40
End: 2025-09-12
Payer: MEDICARE

## 2025-09-17 ENCOUNTER — APPOINTMENT (OUTPATIENT)
Dept: OTOLARYNGOLOGY | Facility: CLINIC | Age: 40
End: 2025-09-17
Payer: MEDICARE